# Patient Record
Sex: MALE | Race: BLACK OR AFRICAN AMERICAN
[De-identification: names, ages, dates, MRNs, and addresses within clinical notes are randomized per-mention and may not be internally consistent; named-entity substitution may affect disease eponyms.]

---

## 2017-10-17 ENCOUNTER — HOSPITAL ENCOUNTER (EMERGENCY)
Dept: HOSPITAL 62 - ER | Age: 32
Discharge: HOME | End: 2017-10-17
Payer: SELF-PAY

## 2017-10-17 VITALS — SYSTOLIC BLOOD PRESSURE: 124 MMHG | DIASTOLIC BLOOD PRESSURE: 77 MMHG

## 2017-10-17 DIAGNOSIS — F17.200: ICD-10-CM

## 2017-10-17 DIAGNOSIS — R03.0: ICD-10-CM

## 2017-10-17 DIAGNOSIS — L03.113: Primary | ICD-10-CM

## 2017-10-17 PROCEDURE — 99281 EMR DPT VST MAYX REQ PHY/QHP: CPT

## 2017-10-17 NOTE — ER DOCUMENT REPORT
ED Skin Rash/Insect Bite/Abscs





- General


Chief Complaint: Insect Bite


Stated Complaint: POSSIBLE INSECT BITE


Time Seen by Provider: 10/17/17 14:05


Mode of Arrival: Ambulatory


Information source: Patient





- HPI


Patient complains to provider of: Tender/swollen area


Onset: Yesterday


Onset/Duration: Gradual


Quality of pain: Achy


Severity: Mild


Skin Temperature: Warm


Quality of rash: Painful


Notes: 





Patient arrives with complaints of possible insect bite or spider bite to the 

right forearm.  He states that yesterday he noticed an area to the right distal 

ulnar area that was a possible bite with some mild redness and swelling.  

States that he tried to squeeze it and now it has become more red and more 

painful.  There has been no drainage from it.  He denies any fevers.  He denies 

any numbness, tingling, weakness.  No injury.  He denies a history of diabetes.

  He denies any nausea, vomiting, diarrhea.  No difficulty breathing or 

swallowing.  He denies any other complaints at this time.





- Related Data


Allergies/Adverse Reactions: 


 





No Known Allergies Allergy (Unverified 10/17/17 13:30)


 











Past Medical History





- Social History


Smoking Status: Current Every Day Smoker


Chew tobacco use (# tins/day):  - 30


Frequency of alcohol use: Social


Drug Abuse: None


Family History: Reviewed & Not Pertinent


Renal/ Medical History: Denies: Hx Peritoneal Dialysis


Surgical Hx: Negative





Review of Systems





- Review of Systems


-: Yes All other systems reviewed and negative





Physical Exam





- Vital signs


Vitals: 


 











Temp Pulse BP Pulse Ox


 


 97.9 F   76   125/76   96 


 


 10/17/17 13:29  10/17/17 13:29  10/17/17 13:29  10/17/17 13:29














- Notes


Notes: 





GENERAL: alert, cooperative, nontoxic, no distress.


HEAD: normocephalic, atraumatic


EYES: conjunctiva pink without discharge, no external redness or swelling.


EARS: no external swelling, no external redness


NOSE: atraumatic, no external swelling


MOUTH/THROAT: mucous membranes moist and pink


NECK: soft, supple, full range of motion, no meningismus.


CHEST: no distress, lungs clear and equal throughout.  No wheezing, rales, 

rhonchi.


CARDIAC: regular rate and rhythm, no murmur, normal capillary refill, normal 

pulses.  


BACK: full range of motion, no CVA tenderness.


EXTREMITIES: full range of motion of all extremities.  Full range of motion of 

the right wrist.  Normal pulse and sensation distally.  Compartments are soft.


NEURO: alert and oriented 3, no focal deficits, full range of motion of all 

extremities.


PYSCH: appropriate mood, affect.  Patient is cooperative.


SKIN: pink, warm, dry, no rash.  Small area of induration with mild surrounding 

erythema to the right distal ulnar area of the forearm.  No fluctuance.  No 

drainage.  No vesicles.








Course





- Re-evaluation


Re-evalutation: 





10/17/17 14:24


Patient is nontoxic appearing with stable vitals.  The patient has an area of 

cellulitis to the right forearm with possible mild induration noted.  There is 

no drainable abscess noted at this time.  Patient has no history of diabetes, 

he is afebrile, no other complaints at this time.  Due to the fact that the 

patient has cellulitis and a possible abscess forming, I will double cover with 

Keflex and Bactrim.  Instructed to apply warm compresses to sore area.  Will 

prescribe a small supply of some pain medication as well.  He should follow-up 

if this is not improving the next 3-4 days, sooner for increased pain, fever, 

increased redness, numbness, tingling, weakness, any further concerns.





The patient is noted to have elevated blood pressure during today's emergency 

department visit.  The patient was informed of this finding.  The patient was 

instructed that this may be related to pre-hypertension and requires further 

evaluation with a primary care provider.  The patient has no hypertensive 

symptoms at this time.





The patient's emergency department workup and current diagnosis were explained 

to the patient and or family.  Follow-up instructions were provided.  

Medications if prescribed were discussed. Instructions for when to return to 

the emergency department including specific  worrisome symptoms were discussed 

with the patient and/or family.





- Vital Signs


Vital signs: 


 











Temp Pulse Resp BP Pulse Ox


 


 97.9 F   76      125/76   96 


 


 10/17/17 13:29  10/17/17 13:29     10/17/17 13:29  10/17/17 13:29














Discharge





- Discharge


Clinical Impression: 


 Right forearm cellulitis





Condition: Stable


Disposition: HOME, SELF-CARE


Instructions:  Cellulitis ([facility])


Additional Instructions: 


Take medications as prescribed.  Apply warm compresses to sore area.  Follow-up 

if not better in 3-4 days, sooner for increased pain, fever, redness, drainage, 

any further concerns.





Your blood pressure was elevated during today's visit.  Have this rechecked 

with your doctor.





The medication you were prescribed today may cause drowsiness.  Do not drive or 

operate heavy machinery while taking this medication.


Prescriptions: 


Cephalexin Monohydrate [Keflex 500 mg Capsule] 500 mg PO Q6H 10 Days  capsule


Hydrocodone/Acetaminophen [Norco 5-325 mg Tablet] 2 tab PO Q6H PRN #10 tab


 PRN Reason: 


Sulfamethoxazole/Trimethoprim [Bactrim Ds Tablet] 1 each PO BID #20 tablet


Forms:  Elevated Blood Pressure


Referrals: 


[facility] [Provider Group] - Follow up as needed

## 2017-12-29 ENCOUNTER — HOSPITAL ENCOUNTER (EMERGENCY)
Dept: HOSPITAL 62 - ER | Age: 32
Discharge: HOME | End: 2017-12-29
Payer: SELF-PAY

## 2017-12-29 VITALS — SYSTOLIC BLOOD PRESSURE: 168 MMHG | DIASTOLIC BLOOD PRESSURE: 92 MMHG

## 2017-12-29 DIAGNOSIS — I10: ICD-10-CM

## 2017-12-29 DIAGNOSIS — M54.9: ICD-10-CM
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
## 2017-12-29 DIAGNOSIS — S70.01XA: ICD-10-CM

## 2017-12-29 DIAGNOSIS — F17.200: ICD-10-CM

## 2017-12-29 DIAGNOSIS — M25.511: ICD-10-CM

## 2017-12-29 DIAGNOSIS — S06.0X0A: Primary | ICD-10-CM

## 2017-12-29 DIAGNOSIS — S93.411A: ICD-10-CM

## 2017-12-29 DIAGNOSIS — R42: ICD-10-CM

## 2017-12-29 PROCEDURE — 73610 X-RAY EXAM OF ANKLE: CPT

## 2017-12-29 PROCEDURE — 73590 X-RAY EXAM OF LOWER LEG: CPT

## 2017-12-29 PROCEDURE — 99284 EMERGENCY DEPT VISIT MOD MDM: CPT

## 2017-12-29 PROCEDURE — 73502 X-RAY EXAM HIP UNI 2-3 VIEWS: CPT

## 2017-12-29 PROCEDURE — 73552 X-RAY EXAM OF FEMUR 2/>: CPT

## 2017-12-29 PROCEDURE — 96372 THER/PROPH/DIAG INJ SC/IM: CPT

## 2017-12-29 PROCEDURE — 72070 X-RAY EXAM THORAC SPINE 2VWS: CPT

## 2017-12-29 PROCEDURE — 73562 X-RAY EXAM OF KNEE 3: CPT

## 2017-12-29 NOTE — RADIOLOGY REPORT (SQ)
EXAM DESCRIPTION: KNEE RIGHT 3 VIEWS



CLINICAL HISTORY:  fall, midthoracic, right hip, knee and ankle

pain



COMPARISON: None.



FINDINGS: 2 views of the right knee. No acute fracture or

dislocation. No joint effusion. Joint spaces preserved.



IMPRESSION:

No acute fracture or dislocation.

## 2017-12-29 NOTE — ER DOCUMENT REPORT
ED Fall





- General


Mode of Arrival: Medic


Information source: Patient





- HPI


Patient complains to provider of: Right hip and ankle pain


Occurred: Just prior to arrival


Where: Home, Other - bathroom


Context: Slipped, Fell from standing


Associated symptoms: Other - see notes above


Location of injury/pain: Other - see notes above





<SHAZIA DAVENPORT - Last Filed: 12/29/17 02:52>





<ZOE SANTANA - Last Filed: 12/29/17 05:21>





- General


Chief Complaint: Fall Injury


Stated Complaint: HIP AND ANKLE PAIN


Time Seen by Provider: 12/29/17 02:40


Notes: 





32 year old male with history of hypertension presents to the ED after slipping 

on water in the bathroom and injuring his right hip and ankle just prior to 

arrival. Patient reports pain to this right hip, ankle, shoulder, and thoracic 

back. Patient states that he hit his right shoulder and head on the wall during 

the fall, reports dizziness and lightheadedness, but denies loss of 

consciousness. Patient has not been able to walk since the fall. Patient has 

had a few alcoholic drinks tonight, but denies mixing anything with energy 

drinks. 





Patient is not on any blood thinning medication.  (SHAZIA DAVENPORT)





- Related data


Allergies/Adverse Reactions: 


 





No Known Allergies Allergy (Unverified 10/17/17 13:30)


 











Past Medical History





- General


Information source: Patient





- Social History


Smoking Status: Current Every Day Smoker


Chew tobacco use (# tins/day): No


Frequency of alcohol use: Occasional


Drug Abuse: None


Family History: Reviewed & Not Pertinent





- Past Medical History


Cardiac Medical History: Reports: Hx Hypertension


Renal/ Medical History: Denies: Hx Peritoneal Dialysis


Surgical Hx: Negative





<SHAZIA DAVENPORT - Last Filed: 12/29/17 02:52>





- Social History


Smoking Education Provided: Yes - 4 mins





<ZOE SANTANA - Last Filed: 12/29/17 05:21>





Review of Systems





- Review of Systems


Constitutional: No symptoms reported


EENT: No symptoms reported


Cardiovascular: See HPI, Dizziness, Lightheaded


Respiratory: No symptoms reported


Gastrointestinal: No symptoms reported


Genitourinary: No symptoms reported


Male Genitourinary: No symptoms reported


Musculoskeletal: See HPI, Back pain, Other - right hip, shoulder, and ankle pain


Skin: No symptoms reported


Hematologic/Lymphatic: No symptoms reported


Neurological/Psychological: No symptoms reported


-: Yes All other systems reviewed and negative





<DAVENPORT,SHAZIA - Last Filed: 12/29/17 02:52>





Physical Exam





<ISSACSHAZIA - Last Filed: 12/29/17 02:52>





<ZOE SANTANA - Last Filed: 12/29/17 05:21>





- Vital signs


Vitals: 


 











Resp Pulse Ox


 


 18   97 


 


 12/29/17 02:43  12/29/17 02:43














- Notes


Notes: 





GENERAL: Alert, interacts well. No acute distress.


HEAD: Normocephalic, atraumatic.


EYES: Pupils equal, round, and reactive to light. Extraocular movements intact.


ENT: Oral mucosa moist, tongue midline.


NECK: Full range of motion. Supple. Trachea midline.


LUNGS: Clear to auscultation bilaterally, no wheezes, rales, or rhonchi. No 

respiratory distress.


HEART: Regular rate and rhythm. No murmurs, gallops, or rubs.


ABDOMEN: Soft, non-tender. Non-distended. Bowel sounds present in all 4 

quadrants.


BACK: Mid to low thoracic tenderness to palpation. No abrasions or bruises. 


EXTREMITIES: No edema, radial and dorsalis pedis pulses 2/4 bilaterally. No 

cyanosis. Tenderness to palpation to the right buttock over the greater 

trochanter and iliac crest. Mild erythema and minimal swelling under the area 

of erythema to the right hip. Right medial malleolus is non-tender. Right 

distal third of the lateral malleolus is tender with palpation. No bony 

deformity. 


NEUROLOGICAL: Alert and oriented x3. Normal speech. Patellar DTRs 2+ 

bilaterally.


PSYCH: Normal affect, normal mood.


SKIN: Warm, dry, normal turgor. See extremity exam above. (ISSACSHAZIA)





Further examination reveals no tenderness to palpation across the right 

shoulder or clavicle, no evidence of trauma to the right shoulder, no 

malalignment.  Is sitting in bed and drinking of water using the right hand 

without any difficulty. (ZOE SANTANA)





Course





<DAVENPORTSHAZIA - Last Filed: 12/29/17 02:52>





<ZOE SANTANA - Last Filed: 12/29/17 05:21>





- Re-evaluation


Re-evalutation: 





12/29/17 04:15


Thoracic spine x-rays are negative for any acute fracture or dislocation.  X-

rays of the right hip, femur, knee, tib-fib and ankle are all negative for 

acute fracture or dislocation.  Patient still complains of pain to the right 

ankle.  Patient will be placed in Ace wrap for possible right ankle sprain as 

well as placed on crutches for the contusion to the right hip and the right 

ankle sprain.  Patient is instructed to remain nonweightbearing for the next 2 

days and should he continue to have significant pain follow-up with orthopedics 

as an outpatient.





No imaging studies of the head necessary as there is no loss of consciousness, 

he is not altered, there is been no vomiting.  Patient has no blood thinners.


12/29/17 04:19


Suspect tachycardia came from pain, after medicating for pain tachycardia 

resolved.


12/29/17 05:21


Patient is known to be hypertensive.  Already prescribed medications. (ZOE SANTANA)





- Vital Signs


Vital signs: 


 











Temp Pulse Resp BP Pulse Ox


 


 98.2 F      13   168/92 H  96 


 


 12/29/17 04:41     12/29/17 04:39  12/29/17 04:39  12/29/17 04:39














Procedures





- Immobilization


  ** Right Ankle


Pre-Proc Neuro Vasc Exam: Normal


Immobilizer type: Ace wrap


Performed by: PCT


Post-Proc Neuro Vasc Exam: Normal, Unchanged from pre-exam


Alignment checked and good: Yes





<ZOE SANTANA - Last Filed: 12/29/17 05:21>





Discharge





<SHAZIA DAVENPORT - Last Filed: 12/29/17 02:52>





<ZOE SANTANA - Last Filed: 12/29/17 05:21>





- Discharge


Clinical Impression: 


 Tobacco abuse, Tobacco abuse counseling





Fall at home


Qualifiers:


 Encounter type: initial encounter Qualified Code(s): W19.XXXA - Unspecified 

fall, initial encounter





Concussion


Qualifiers:


 Encounter type: initial encounter Loss of consciousness presence/duration: 

without LOC Qualified Code(s): S06.0X0A - Concussion without loss of 

consciousness, initial encounter





Right shoulder pain


Qualifiers:


 Chronicity: acute Qualified Code(s): M25.511 - Pain in right shoulder





Right ankle sprain


Qualifiers:


 Encounter type: initial encounter Involved ligament of ankle: calcaneofibular 

ligament Qualified Code(s): S93.411A - Sprain of calcaneofibular ligament of 

right ankle, initial encounter





Contusion of right hip


Qualifiers:


 Encounter type: initial encounter Qualified Code(s): S70.01XA - Contusion of 

right hip, initial encounter





Condition: Stable


Disposition: HOME, SELF-CARE


Additional Instructions: 


Today there is no evidence of broken bones in your shoulder, back, right hip, 

knee or ankle.  You do appear to have bruising to your right hip and a sprain 

to your right ankle.





Sprained Ankle





     Your sprained ankle results from stretching or tearing of the ligaments 

which support the ankle.  This usually results from twisting the foot inward 

and under.  The ligaments will require time and protection in order to heal 

properly.  Many ankle sprains are quite disabling, and should be taken 

seriously.


     The usual treatment for an ankle sprain is cold packs; protection with tape

, splints, or wraps; elevation; and staying off the ankle for at least a day.  

As the ankle improves, you can walk IF it's not painful to bear weight.  Sports 

are best postponed until healing is complete.  More serious sprains usually 

require strengthening exercises after early healing.


     Your physician has assessed the seriousness of the ligament injury to your 

ankle.  However, the treatment may change, depending on how your ankle 

progresses.  If further exams were recommended, it is important that you follow 

through.  Call the doctor if your foot becomes numb, painful, or severely 

swollen.





Ankle Exercise Program





     To restore the ankle to normal, it's important to strengthen the muscles 

that support it -- especially those that lift the foot upward. Good muscle tone 

will keep stress off the injury while it heals.


     EARLY - Once the doctor allows you to walk on the ankle, you can begin.  

Lean your back against a wall.  Standing on your heels, lift the balls of both 

feet up, hold a second, then back down.  Work up to 100 repetitions.  Next, 

using the wall for balance, stand on one foot. Lift the heel up, then down.  

Work up to 100 repetitions for each foot.


     BALANCE TRAINING - To retrain the ankle to react to "tipping", stand on 

one foot for two minutes.  Go from flat-footed to standing on the toes and back 

again slowly.  Repeat with the other foot.


     LATER - When your ankle has regained full motion and you're walking pain-

free, begin exercise against resistance.  In a sitting position, pull the top 

of the foot towards you against resistance 20 times.  Use either elastic 

material or a weight attached to the toes. Swing the knee so the foot is to the 

side of the body, and repeat.











Forms:  Smoking Cessation Education


Scribe Attestation: 





12/29/17 05:21


I personally performed the services described in the documentation, reviewed 

and edited the documentation which was dictated to the scribe in my presence, 

and it accurately records my words and actions. (ZOE SANTANA)





Scribe Documentation





- Scribe


Written by Yrn:: Yrn Bautista, 12/29/2017 0300


acting as scribe for :: Jg





<SHAZIA DAVENPORT - Last Filed: 12/29/17 02:52>

## 2017-12-29 NOTE — RADIOLOGY REPORT (SQ)
EXAM DESCRIPTION: TIBIA FIBULA RIGHT



CLINICAL HISTORY:  fall, midthoracic, right hip, knee and ankle

pain



COMPARISON: None.



FINDINGS: 2 views of the right tibia and fibula. No acute

fracture or dislocation. Normal osseous mineralization. No

radiopaque foreign bodies.



IMPRESSION:

No acute fracture or dislocation.

## 2017-12-29 NOTE — RADIOLOGY REPORT (SQ)
EXAM DESCRIPTION: HIP RIGHT AP/LATERAL



CLINICAL HISTORY:  fall, midthoracic, right hip, knee and ankle

pain



COMPARISON: None.



FINDINGS: Single frontal view of the pelvis and lateral view of

the right hip. No acute fracture or dislocation. Normal osseous

mineralization. Joint spaces preserved. Pelvic soft tissues are

unremarkable.



IMPRESSION:

No acute fracture or dislocation

## 2017-12-29 NOTE — RADIOLOGY REPORT (SQ)
EXAM DESCRIPTION: ANKLE RIGHT COMPLETE



CLINICAL HISTORY:  fall, midthoracic, right hip, knee and ankle

pain



COMPARISON: None.



FINDINGS: 3 views of the right ankle. No acute fracture or

dislocation. Normal osseous mineralization. Base of the fifth

metatarsal is intact. Tibial plafond and talar dome are in

appropriate alignment.



IMPRESSION:

No acute fracture or dislocation.

## 2017-12-29 NOTE — RADIOLOGY REPORT (SQ)
EXAM DESCRIPTION: FEMUR RIGHT



CLINICAL HISTORY:  fall, midthoracic, right hip, knee and ankle

pain



COMPARISON: None.



FINDINGS: 2 views of the right femur. No acute fracture or

dislocation. Normal osseous mineralization. No radiopaque foreign

bodies.



IMPRESSION:

No acute fracture or dislocation.

## 2017-12-29 NOTE — RADIOLOGY REPORT (SQ)
EXAM DESCRIPTION: T SPINE AP/LAT



CLINICAL HISTORY:  fall, midthoracic, right hip, knee and ankle

pain



COMPARISON: None.



FINDINGS: 2 views of the thoracic spine. Leads overlie the chest.

T1 not well evaluated on lateral view due to overlying soft

tissues. Thoracic vertebral body height preserved. Intervertebral

disc height preserved. No subluxation or cortical step-off

identified. No widening of the paraspinous lines. No

abnormalities of the visualized lungs or mediastinum. Upper

abdominal soft tissues unremarkable. No fractures the visualized

ribs or clavicles.



IMPRESSION:



1. No acute abnormality of the thoracic spine by plain film

criteria.

## 2018-10-27 ENCOUNTER — HOSPITAL ENCOUNTER (EMERGENCY)
Dept: HOSPITAL 62 - ER | Age: 33
Discharge: HOME | End: 2018-10-27
Payer: SELF-PAY

## 2018-10-27 VITALS — DIASTOLIC BLOOD PRESSURE: 99 MMHG | SYSTOLIC BLOOD PRESSURE: 154 MMHG

## 2018-10-27 DIAGNOSIS — S79.911A: ICD-10-CM

## 2018-10-27 DIAGNOSIS — F17.200: ICD-10-CM

## 2018-10-27 DIAGNOSIS — I10: ICD-10-CM

## 2018-10-27 DIAGNOSIS — J45.21: Primary | ICD-10-CM
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
## 2018-10-27 DIAGNOSIS — R06.02: ICD-10-CM

## 2018-10-27 DIAGNOSIS — M25.551: ICD-10-CM

## 2018-10-27 PROCEDURE — 71045 X-RAY EXAM CHEST 1 VIEW: CPT

## 2018-10-27 PROCEDURE — 73502 X-RAY EXAM HIP UNI 2-3 VIEWS: CPT

## 2018-10-27 PROCEDURE — 99285 EMERGENCY DEPT VISIT HI MDM: CPT

## 2018-10-27 PROCEDURE — 94640 AIRWAY INHALATION TREATMENT: CPT

## 2018-10-27 PROCEDURE — 93005 ELECTROCARDIOGRAM TRACING: CPT

## 2018-10-27 PROCEDURE — 93010 ELECTROCARDIOGRAM REPORT: CPT

## 2018-10-27 RX ADMIN — ALBUTEROL SULFATE ONE: 90 AEROSOL, METERED RESPIRATORY (INHALATION) at 22:26

## 2018-10-27 RX ADMIN — ALBUTEROL SULFATE ONE PUFF: 90 AEROSOL, METERED RESPIRATORY (INHALATION) at 22:37

## 2018-10-27 NOTE — ER DOCUMENT REPORT
ED General





- General


Chief Complaint: Shortness Of Breath


Stated Complaint: BREATHING PROBLEM


Time Seen by Provider: 10/27/18 20:45


Notes: 





Patient is a 33-year-old male with a past medical history of asthma who 

presents with 2 complaints.  His first complaint is shortness of breath.  The 

patient states that he has been having shortness of breath feel like he has 

been having an asthma attack for the past several hours.  He could not find his 

albuterol inhaler.  This prompted him to contact EMS.  He states that he feels 

much better after receiving treatment in route.  There is initially report that 

the patient had inhaled methamphetamine which he denies.  The patient also 

complains of right hip pain.  He states that a file cabinet hit into his right 

hip earlier today and he has had a dull, throbbing pain since that time.  

Denies inability to ambulate.  No weakness or numbness to the leg.  Nothing 

improves or worsens the pain.  He does not have a general physician.


TRAVEL OUTSIDE OF THE [place] IN LAST 30 DAYS: No





- Related Data


Allergies/Adverse Reactions: 


 





No Known Allergies Allergy (Verified 10/27/18 20:45)


 











Past Medical History





- General


Information source: Patient





- Social History


Smoking Status: Current Every Day Smoker


Frequency of alcohol use: daily


Drug Abuse: None


Lives with: Spouse/Significant other


Family History: Reviewed & Not Pertinent


Patient has suicidal ideation: No


Patient has homicidal ideation: No





- Past Medical History


Cardiac Medical History: Reports: Hx Hypertension


Renal/ Medical History: Denies: Hx Peritoneal Dialysis





Review of Systems





- Review of Systems


Notes: 





Constitutional: Negative for fever.


HENT: Negative for sore throat.


Eyes: Negative for visual changes.


Cardiovascular: Negative for chest pain.


Respiratory: Positive for shortness of breath.


Gastrointestinal: Negative for abdominal pain, vomiting or diarrhea.


Genitourinary: Negative for dysuria.


Musculoskeletal: Positive for right hip pain


Skin: Negative for rash.


Neurological: Negative for headaches, weakness or numbness.





10 point ROS negative except as marked above and in HPI.





Physical Exam





- Vital signs


Vitals: 





 











Temp Pulse Resp BP Pulse Ox


 


 98.4 F   96   12   160/108 H  96 


 


 10/27/18 20:35  10/27/18 20:35  10/27/18 20:35  10/27/18 20:35  10/27/18 20:35











Interpretation: Hypertensive


Notes: 





PHYSICAL EXAMINATION:





GENERAL: Well-appearing, well-nourished and in no acute distress.





HEAD: Atraumatic, normocephalic.





EYES: Pupils equal round and reactive to light, extraocular movements intact, 

sclera anicteric, conjunctiva are normal.





ENT: nares patent, oropharynx clear without exudates.  Moist mucous membranes.





NECK: Normal range of motion, supple without lymphadenopathy





LUNGS: Breath sounds clear to auscultation bilaterally and equal.  No wheezes 

rales or rhonchi.





HEART: Regular rate and rhythm without murmurs





ABDOMEN: Soft, nontender, normoactive bowel sounds.  No guarding, no rebound.  

No masses appreciated.





EXTREMITIES: Normal range of motion, no pitting or edema.  No cyanosis.





NEUROLOGICAL: No focal neurological deficits. Moves all extremities 

spontaneously and on command.





PSYCH: Restless, picking at himself otherwise pleasant and cooperative





SKIN: Warm, Dry, normal turgor, no rashes or lesions noted.





Course





- Re-evaluation


Re-evalutation: 





10/27/18 20:55


Patient presents complaining of shortness of breath and feeling like he is 

having an asthma exacerbation.  The patient has no appreciable wheezes or 

diminished air movement on lung examination to suggest an acute exacerbation at 

this time although he did receive an albuterol treatment prior to arrival.  

Chest x-ray is clear.  Medical history and exam not consistent with acute PE or 

pneumonia.  Do not suspect ACS.  EKG unremarkable.  Patient has denied chest 

pain.  In regards to the patient's right hip: There is no visible evidence of 

trauma or bruising on exam.  Full range of motion at the hip.  X-ray of the hip 

is normal.  Although patient does deny being on methamphetamine, he is 

exhibiting some behaviors of being intoxicated on some sort of stimulant.  At 

this time will discharge with return precautions and follow-up recommendations.

  Verbal discharge instructions given a the bedside and opportunity for 

questions given. Medication warnings reviewed. Patient is in agreement with 

this plan and has verbalized understanding of return precautions and the need 

for primary care follow-up in the next 24-72 hours.





- Vital Signs


Vital signs: 





 











Temp Pulse Resp BP Pulse Ox


 


 98.4 F   96   12   160/108 H  96 


 


 10/27/18 20:35  10/27/18 20:35  10/27/18 20:35  10/27/18 20:35  10/27/18 20:35














- Diagnostic Test


Radiology reviewed: Image reviewed, Reports reviewed


Radiology results interpreted by me: 





10/27/18 20:56


Chest x-ray: No acute infiltrate or pneumothorax





Right hip x-ray: No acute fracture or dislocation





Discharge





- Discharge


Clinical Impression: 


Asthma attack


Qualifiers:


 Asthma severity: mild Asthma persistence: intermittent Qualified Code(s): 

J45.21 - Mild intermittent asthma with (acute) exacerbation





Injury of right hip


Qualifiers:


 Encounter type: initial encounter Qualified Code(s): S79.911A - Unspecified 

injury of right hip, initial encounter





Condition: Good


Disposition: HOME, SELF-CARE


Additional Instructions: 


You were seen for an asthma exacerbation.  Your symptoms improved with 

treatment here in the emergency department.  However, it is very important that 

you return to the emergency department immediately if you began to have 

worsening difficulty breathing that does not respond to your normal home 

nebulizers. Please also follow closely with your primary care physician.  you 

should also return to emergency department if you develop fever greater than 101

, persistent cough, persistent vomiting, pass out, or any other symptoms that 

are concerning to you.  The x-ray of your right hip is otherwise normal.  You 

may take Tylenol or ibuprofen per box instructions as needed for pain or 

discomfort to the area.

## 2018-10-27 NOTE — RADIOLOGY REPORT (SQ)
CLINICAL DATA:



33-year-old male with right hip pain



TECHNICAL DATA:



Two x-ray views of the right hip were performed on 10/27/2018 at

9:27 PM. An AP pelvis and frog-leg lateral view of the right hip

were performed.



COMPARISONS: None



FINDINGS:



There is no evidence of fracture or dislocation. There is no

significant arthritis or degenerative change. No focal lytic or

sclerotic bone lesions are seen.  

 

Bone mineralization is normal



No focal soft tissue abnormalities are identified.



IMPRESSION:



No evidence of acute osseous injury involving the pelvis and

right hip.

## 2018-10-27 NOTE — RADIOLOGY REPORT (SQ)
EXAM DESCRIPTION: X-ray single view chest.



CLINICAL HISTORY: 33 years Male, sob



COMPARISON: None.



TECHNIQUE: Single portable view of the chest performed on

10/27/2018 at 9:30 PM



FINDINGS: 

The lungs are well expanded and are clear. There is no evidence

of a pneumothorax.



The cardiac silhouette is normal in size and configuration.



The mediastinal contours are normal.



No acute osseous abnormality is identified.



No focal soft tissue abnormalities are seen.



Lines and tubes:  None.



IMPRESSION:

No evidence of acute intrathoracic disease.

## 2019-10-17 ENCOUNTER — HOSPITAL ENCOUNTER (EMERGENCY)
Dept: HOSPITAL 62 - ER | Age: 34
Discharge: HOME | End: 2019-10-17
Payer: SELF-PAY

## 2019-10-17 VITALS — SYSTOLIC BLOOD PRESSURE: 125 MMHG | DIASTOLIC BLOOD PRESSURE: 85 MMHG

## 2019-10-17 DIAGNOSIS — I10: ICD-10-CM

## 2019-10-17 DIAGNOSIS — H60.503: Primary | ICD-10-CM

## 2019-10-17 PROCEDURE — 99282 EMERGENCY DEPT VISIT SF MDM: CPT

## 2019-10-17 NOTE — ER DOCUMENT REPORT
HPI





- HPI


Time Seen by Provider: 10/17/19 13:06


Notes: 





Patient is a an otherwise healthy 34-year-old male presenting with bilateral ear

pain.  Denies any fevers, drainage from the area reports slightly muffled 

hearing.








Past Medical History





- General


Information source: Patient





- Social History


Smoking Status: Never Smoker


Frequency of alcohol use: None


Drug Abuse: None


Family History: Reviewed & Not Pertinent





- Past Medical History


Cardiac Medical History: Reports: Hx Hypertension


Renal/ Medical History: Denies: Hx Peritoneal Dialysis





[facility] Provider Document





- CONSTITUTIONAL


Notes: 





PHYSICAL EXAMINATION:





GENERAL: Well-appearing, well-nourished and in no acute distress.





HEAD: Atraumatic, normocephalic.





EYES: Pupils equal round extraocular movements intact,  conjunctiva are normal.





ENT: Nares patent, bilateral TMs slightly obscured by cerumen, no obvious 

erythema or bulging.  Bilateral ear canals erythematous and macerated.





NECK: Normal range of motion





LUNGS: No respiratory distress





Musculoskeletal: Normal range of motion





NEUROLOGICAL:  Normal speech, normal gait. 





PSYCH: Normal mood, normal affect.





SKIN: Warm, Dry, normal turgor, no rashes or lesions noted.





- INFECTION CONTROL


TRAVEL OUTSIDE OF THE [place] IN LAST 30 DAYS: No





Course





- Re-evaluation


Re-evalutation: 





Exam consistent with otitis externa.  No mastoid tenderness present.  Will place

patient on Ciprodex drops.  Patient given ED return precautions, patient 

verbalizes understanding and agreement with this.





The patient's emergency department workup and current diagnosis were explained 

to the patient and or family.  Follow-up instructions were provided.  

Medications if prescribed were discussed. Instructions for when to return to the

emergency department including specific worrisome symptoms were discussed with 

the patient and/or family.








- Vital Signs


Vital signs: 


                                        











Temp Pulse Resp BP Pulse Ox


 


 98.1 F   61   18   125/85   100 


 


 10/17/19 12:36  10/17/19 12:36  10/17/19 12:36  10/17/19 12:36  10/17/19 12:36














Discharge





- Discharge


Clinical Impression: 


Otitis externa


Qualifiers:


 Otitis externa type: unspecified type Chronicity: acute Laterality: bilateral 

Qualified Code(s): H60.503 - Unspecified acute noninfective otitis externa, 

bilateral





Condition: Stable


Disposition: HOME, SELF-CARE


Instructions:  Otitis Externa ([facility])


Additional Instructions: 


Otitis Externa





     You have otitis externa -- an infection of the outer ear canal. This can be

very painful.  It's sometimes called "swimmer's ear," because it often occurs 

after prolonged water exposure.  Many things, such as earwax and dirt in the 

ear, can contribute to it.


     The usual treatment is antibiotic/antiinflammatory ear drops. Occasionally,

a wick will be placed in the ear to draw in the medicine. If the infection is 

severe, an oral antibiotic may be prescribed.  Pain medication is often needed. 

Avoid getting water in the ear.


     Outer ear infections often take longer to heal than you might expect.  Some

tenderness and ache in the ear may persist for about two weeks.


     See your physician if you fail to improve as expected.  Call the doctor at 

once if you develop fever, increasing swelling (particularly if it makes your 

ear "poke out"), severe headache, stiff neck, or decreased hearing.








Use the ciprodex drops 4 drops twice daily for 7 days.





Referrals: 


FELISHA CORNEJO PA-C [Primary Care Provider] - Follow up as needed